# Patient Record
Sex: MALE | Race: WHITE | NOT HISPANIC OR LATINO | Employment: FULL TIME | URBAN - METROPOLITAN AREA
[De-identification: names, ages, dates, MRNs, and addresses within clinical notes are randomized per-mention and may not be internally consistent; named-entity substitution may affect disease eponyms.]

---

## 2024-09-10 ENCOUNTER — HOSPITAL ENCOUNTER (OUTPATIENT)
Dept: RADIOLOGY | Facility: HOSPITAL | Age: 29
Discharge: HOME | End: 2024-09-10
Attending: ORTHOPAEDIC SURGERY
Payer: COMMERCIAL

## 2024-09-10 ENCOUNTER — OFFICE VISIT (OUTPATIENT)
Dept: ORTHOPEDICS | Facility: CLINIC | Age: 29
End: 2024-09-10
Payer: COMMERCIAL

## 2024-09-10 VITALS — HEIGHT: 72 IN | BODY MASS INDEX: 28.44 KG/M2 | WEIGHT: 210 LBS

## 2024-09-10 DIAGNOSIS — M79.641 RIGHT HAND PAIN: Primary | ICD-10-CM

## 2024-09-10 DIAGNOSIS — M79.641 RIGHT HAND PAIN: ICD-10-CM

## 2024-09-10 DIAGNOSIS — S62.324A CLOSED DISPLACED FRACTURE OF SHAFT OF FOURTH METACARPAL BONE OF RIGHT HAND, INITIAL ENCOUNTER: Primary | ICD-10-CM

## 2024-09-10 PROBLEM — S62.304A CLOSED DISPLACED FRACTURE OF FOURTH METACARPAL BONE OF RIGHT HAND: Status: ACTIVE | Noted: 2024-09-10

## 2024-09-10 PROCEDURE — 73130 X-RAY EXAM OF HAND: CPT | Mod: TC,RT

## 2024-09-10 PROCEDURE — 1159F MED LIST DOCD IN RCRD: CPT | Mod: CPTII,S$GLB,, | Performed by: ORTHOPAEDIC SURGERY

## 2024-09-10 PROCEDURE — 99204 OFFICE O/P NEW MOD 45 MIN: CPT | Mod: S$GLB,,, | Performed by: ORTHOPAEDIC SURGERY

## 2024-09-10 PROCEDURE — 1160F RVW MEDS BY RX/DR IN RCRD: CPT | Mod: CPTII,S$GLB,, | Performed by: ORTHOPAEDIC SURGERY

## 2024-09-10 PROCEDURE — 3008F BODY MASS INDEX DOCD: CPT | Mod: CPTII,S$GLB,, | Performed by: ORTHOPAEDIC SURGERY

## 2024-09-10 PROCEDURE — 99999 PR PBB SHADOW E&M-NEW PATIENT-LVL II: CPT | Mod: PBBFAC,,, | Performed by: ORTHOPAEDIC SURGERY

## 2024-09-10 PROCEDURE — 73130 X-RAY EXAM OF HAND: CPT | Mod: 26,RT,, | Performed by: RADIOLOGY

## 2024-09-10 RX ORDER — PANTOPRAZOLE SODIUM 40 MG/1
40 TABLET, DELAYED RELEASE ORAL
COMMUNITY
Start: 2024-07-03

## 2024-09-10 RX ORDER — DEXTROAMPHETAMINE SACCHARATE, AMPHETAMINE ASPARTATE, DEXTROAMPHETAMINE SULFATE AND AMPHETAMINE SULFATE 3.75; 3.75; 3.75; 3.75 MG/1; MG/1; MG/1; MG/1
2 TABLET ORAL NIGHTLY
COMMUNITY
Start: 2024-06-30

## 2024-09-10 RX ORDER — DEXTROAMPHETAMINE SACCHARATE, AMPHETAMINE ASPARTATE MONOHYDRATE, DEXTROAMPHETAMINE SULFATE AND AMPHETAMINE SULFATE 5; 5; 5; 5 MG/1; MG/1; MG/1; MG/1
CAPSULE, EXTENDED RELEASE ORAL EVERY MORNING
COMMUNITY
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
## 2024-09-10 NOTE — ASSESSMENT & PLAN NOTE
The patient and I talked at length about the natural history and pathophysiology of his injury which is right ring finger metacarpal fracture displaced , he understands that this may lead to chronic problems which may have acute episodic exacerbations.   Symptoms may resolve, worsen and even become permanent.  The patient understands the treatment options including observation, activity modification, therapy, NSAIDs, splints and the surgical options including open reduction internal fixation.  We discussed the risks of the diagnosis and the treatment options including pain, infection, bleeding, damage to nerves and vessels, stiffness, scarring, incomplete relief or recurrence of symptoms, poor pain and functional outcomes.  Unique risks of this diagnosis and the treatment include malunion nonunion stiffness delayed union.  The patient has elected to take 24 hours to think about it he will call us tomorrow if he decides to proceed with surgery we will schedule him for Friday.  If he elects to proceed with nonoperative treatment he understands he will likely continue to have an extensor lag and we will need to keep an eye on his x-rays weekly.
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
## 2024-09-12 ENCOUNTER — ANESTHESIA EVENT (OUTPATIENT)
Dept: SURGERY | Facility: HOSPITAL | Age: 29
End: 2024-09-12
Payer: COMMERCIAL

## 2024-09-12 ENCOUNTER — TELEPHONE (OUTPATIENT)
Dept: PREADMISSION TESTING | Facility: HOSPITAL | Age: 29
End: 2024-09-12
Payer: COMMERCIAL

## 2024-09-12 DIAGNOSIS — S62.324A CLOSED DISPLACED FRACTURE OF SHAFT OF FOURTH METACARPAL BONE OF RIGHT HAND, INITIAL ENCOUNTER: Primary | ICD-10-CM

## 2024-09-12 NOTE — TELEPHONE ENCOUNTER
Pre op instructions reviewed with patient per phone.      To confirm, your doctor has instructed you: Surgery is scheduled for 9/13/24 and arrival @ 8AM.    Surgery will be at [facility],  The address is [address]. [place] [address].      IMPORTANT INSTRUCTIONS!    Do not eat or drink after 12 midnight, including water. Do not smoke or use chewing tobacco after 12 midnight!  OK to brush teeth, but no gum, candy, or mints!      *Take only these medicines with a small swallow of water-morning of surgery*     Protonix         ____ Stop taking all vitamins, herbal supplements, Aspirin, & NSAIDS (Ibuprofen, Advil, Aleve) 7 days prior to surgery, as these can thin your blood.    ____ Weight loss medication, such as Adipex and Phentermine, must be stopped 14 days prior to surgery, no exceptions!    *Diabetic Patients: If you take diabetic or weight loss medication, do NOT take morning of surgery unless instructed by Doctor. Metformin to be stopped 24 hrs prior to surgery. DO NOT take short-acting insulin the day of surgery. Only take HALF of your regular dose of long-acting insulin the night before surgery, unless instructed otherwise. Blood sugars will be checked in pre-op.   ~Ozempic/Mounjaro/Wegovy/Trulicity/Semaglutide injections must be stopped 7 days prior to surgery.     Please notify MD office if you develop an active infection, are prescribed antibiotics by someone other than the surgeon doing your surgery, or visit urgent care/ER.      Bathing Instructions:   The night before surgery and the morning prior to coming to the hospital:    - Shower & rinse your body as usual with anti-bacterial Soap (Dial or Lever 2000)   -Hibiclens (if indicated) use AFTER anti-bacterial soap; 1 packet PM/1 packet in AM on surgical site only   -Do not use hibiclens on your head, face, or genitals.    -Do not wash with anti-bacterial soap after you use the hibiclens.    -Do not shave surgical site 5-7 days  prior to surgery.    -Pubic hair 7 days prior to surgery (OBGYN/Urology only).       Additional Instructions:   __ No makeup, powder, lotions, creams, or body spray to skin   __ No deodorant if you are having: breast procedure, PORT, or upper shoulder surgery!   __ No nail polish or artificial nails       **SURGERY WILL BE CANCELLED IF ARTIFICIAL/NAIL POLISH IS PRESENT!!!**  __ Please remove all piercings and leave all jewelry at home.    **SURGERY WILL BE CANCELLED IF PIERCINGS ARE PRESENT!!!**    __ Dentures, Hearing Aids and Contact Lens need to be removed prior to the start of surgery.    __ Avoid Alcoholic beverages 3 days prior to surgery, as it can thin the blood, unless told otherwise by pre-admit department.  __ Females: may need to give a urine sample the morning of surgery;   **Please ask  for a specimen cup if you need to use the restroom prior to being called into pre-op.**  __ Males: Stop ED medications (Viagra, Cialis) 24 hrs prior to surgery.  __ Wear clean, loose-fitting clothing. Allow for dressings/bandages/surgical equipment   __ You must have transportation, and they MUST stay the entire time.   __  Bring photo ID and insurance information to hospital    [facility] Visitor/Ride Policy:    Only 1 adult allowed (18 or older) to accompany you and MUST STAY through the entire admission length.      -Must have a ride home from a responsible adult that you know and trust.     -Medical Transport, Uber or Lyft can only be used if patient has a responsible adult to   accompany them during ride home.      ~Your ride MUST STAY the entire time until you are discharged~        *If you are running late or have questions the morning of surgery, please call the Pre-OP Department @ [phone].       *Please Call [facility] Pre-Admit Department for surgery instruction questions:   [phone]       Financial Questions:                                  Additional Payment Question Numbers:    [place]: [phone] or [phone]

## 2024-09-13 ENCOUNTER — HOSPITAL ENCOUNTER (OUTPATIENT)
Facility: HOSPITAL | Age: 29
Discharge: HOME | End: 2024-09-13
Attending: ORTHOPAEDIC SURGERY | Admitting: ORTHOPAEDIC SURGERY
Payer: COMMERCIAL

## 2024-09-13 ENCOUNTER — ANESTHESIA (OUTPATIENT)
Dept: SURGERY | Facility: HOSPITAL | Age: 29
End: 2024-09-13
Payer: COMMERCIAL

## 2024-09-13 ENCOUNTER — HOSPITAL ENCOUNTER (OUTPATIENT)
Dept: RADIOLOGY | Facility: HOSPITAL | Age: 29
Discharge: HOME | End: 2024-09-13
Attending: ORTHOPAEDIC SURGERY | Admitting: ORTHOPAEDIC SURGERY
Payer: COMMERCIAL

## 2024-09-13 VITALS
BODY MASS INDEX: 28.98 KG/M2 | HEART RATE: 80 BPM | OXYGEN SATURATION: 96 % | DIASTOLIC BLOOD PRESSURE: 75 MMHG | SYSTOLIC BLOOD PRESSURE: 132 MMHG | HEIGHT: 72 IN | RESPIRATION RATE: 17 BRPM | WEIGHT: 213.94 LBS | TEMPERATURE: 98 F

## 2024-09-13 DIAGNOSIS — Z41.9 SURGERY, ELECTIVE: ICD-10-CM

## 2024-09-13 DIAGNOSIS — S62.324A CLOSED DISPLACED FRACTURE OF SHAFT OF FOURTH METACARPAL BONE OF RIGHT HAND, INITIAL ENCOUNTER: Primary | ICD-10-CM

## 2024-09-13 PROCEDURE — 63600175 PHARM REV CODE 636 W HCPCS: Performed by: NURSE ANESTHETIST, CERTIFIED REGISTERED

## 2024-09-13 PROCEDURE — 25000003 PHARM REV CODE 250: Performed by: NURSE ANESTHETIST, CERTIFIED REGISTERED

## 2024-09-13 PROCEDURE — 25000003 PHARM REV CODE 250: Performed by: ANESTHESIOLOGY

## 2024-09-13 PROCEDURE — 27200651 HC AIRWAY, LMA: Performed by: ANESTHESIOLOGY

## 2024-09-13 PROCEDURE — 63600175 PHARM REV CODE 636 W HCPCS: Performed by: ANESTHESIOLOGY

## 2024-09-13 PROCEDURE — 64415 NJX AA&/STRD BRCH PLXS IMG: CPT | Performed by: ANESTHESIOLOGY

## 2024-09-13 PROCEDURE — 26615 TREAT METACARPAL FRACTURE: CPT | Mod: LT,,, | Performed by: ORTHOPAEDIC SURGERY

## 2024-09-13 PROCEDURE — 64417 NJX AA&/STRD AX NERVE IMG: CPT | Mod: 59,RT,, | Performed by: ANESTHESIOLOGY

## 2024-09-13 PROCEDURE — 73120 X-RAY EXAM OF HAND: CPT | Mod: 26,RT,, | Performed by: RADIOLOGY

## 2024-09-13 PROCEDURE — 73120 X-RAY EXAM OF HAND: CPT | Mod: TC,RT

## 2024-09-13 RX ORDER — DEXAMETHASONE SODIUM PHOSPHATE 4 MG/ML
INJECTION, SOLUTION INTRA-ARTICULAR; INTRALESIONAL; INTRAMUSCULAR; INTRAVENOUS; SOFT TISSUE
Status: DISCONTINUED | OUTPATIENT
Start: 2024-09-13 | End: 2024-09-13

## 2024-09-13 RX ORDER — CHLORHEXIDINE GLUCONATE ORAL RINSE 1.2 MG/ML
10 SOLUTION DENTAL
Status: DISCONTINUED | OUTPATIENT
Start: 2024-09-13 | End: 2024-09-13 | Stop reason: HOSPADM

## 2024-09-13 RX ORDER — PROPOFOL 10 MG/ML
VIAL (ML) INTRAVENOUS
Status: DISCONTINUED | OUTPATIENT
Start: 2024-09-13 | End: 2024-09-13

## 2024-09-13 RX ORDER — FENTANYL CITRATE 50 UG/ML
INJECTION, SOLUTION INTRAMUSCULAR; INTRAVENOUS
Status: DISCONTINUED | OUTPATIENT
Start: 2024-09-13 | End: 2024-09-13

## 2024-09-13 RX ORDER — GLUCAGON 1 MG
1 KIT INJECTION
Status: DISCONTINUED | OUTPATIENT
Start: 2024-09-13 | End: 2024-09-13 | Stop reason: HOSPADM

## 2024-09-13 RX ORDER — DEXMEDETOMIDINE HYDROCHLORIDE 100 UG/ML
INJECTION, SOLUTION INTRAVENOUS
Status: DISCONTINUED | OUTPATIENT
Start: 2024-09-13 | End: 2024-09-13

## 2024-09-13 RX ORDER — FENTANYL CITRATE 50 UG/ML
25 INJECTION, SOLUTION INTRAMUSCULAR; INTRAVENOUS EVERY 5 MIN PRN
Status: DISCONTINUED | OUTPATIENT
Start: 2024-09-13 | End: 2024-09-13 | Stop reason: HOSPADM

## 2024-09-13 RX ORDER — HYDROCODONE BITARTRATE AND ACETAMINOPHEN 5; 325 MG/1; MG/1
1 TABLET ORAL EVERY 6 HOURS PRN
Qty: 10 TABLET | Refills: 0 | Status: SHIPPED | OUTPATIENT
Start: 2024-09-13 | End: 2024-09-20

## 2024-09-13 RX ORDER — CEFAZOLIN SODIUM 1 G/3ML
INJECTION, POWDER, FOR SOLUTION INTRAMUSCULAR; INTRAVENOUS
Status: DISCONTINUED | OUTPATIENT
Start: 2024-09-13 | End: 2024-09-13

## 2024-09-13 RX ORDER — ONDANSETRON HYDROCHLORIDE 2 MG/ML
INJECTION, SOLUTION INTRAMUSCULAR; INTRAVENOUS
Status: DISCONTINUED | OUTPATIENT
Start: 2024-09-13 | End: 2024-09-13

## 2024-09-13 RX ORDER — ROPIVACAINE HYDROCHLORIDE 5 MG/ML
INJECTION, SOLUTION EPIDURAL; INFILTRATION; PERINEURAL
Status: COMPLETED | OUTPATIENT
Start: 2024-09-13 | End: 2024-09-13

## 2024-09-13 RX ORDER — MEPERIDINE HYDROCHLORIDE 25 MG/ML
12.5 INJECTION INTRAMUSCULAR; INTRAVENOUS; SUBCUTANEOUS ONCE
Status: DISCONTINUED | OUTPATIENT
Start: 2024-09-13 | End: 2024-09-13 | Stop reason: HOSPADM

## 2024-09-13 RX ORDER — LIDOCAINE HYDROCHLORIDE 10 MG/ML
INJECTION, SOLUTION EPIDURAL; INFILTRATION; INTRACAUDAL; PERINEURAL
Status: COMPLETED | OUTPATIENT
Start: 2024-09-13 | End: 2024-09-13

## 2024-09-13 RX ORDER — MIDAZOLAM HYDROCHLORIDE 1 MG/ML
INJECTION INTRAMUSCULAR; INTRAVENOUS
Status: DISCONTINUED | OUTPATIENT
Start: 2024-09-13 | End: 2024-09-13

## 2024-09-13 RX ORDER — DIPHENHYDRAMINE HYDROCHLORIDE 50 MG/ML
25 INJECTION INTRAMUSCULAR; INTRAVENOUS EVERY 6 HOURS PRN
Status: DISCONTINUED | OUTPATIENT
Start: 2024-09-13 | End: 2024-09-13 | Stop reason: HOSPADM

## 2024-09-13 RX ORDER — CHLORHEXIDINE GLUCONATE ORAL RINSE 1.2 MG/ML
10 SOLUTION DENTAL 2 TIMES DAILY
Status: DISCONTINUED | OUTPATIENT
Start: 2024-09-13 | End: 2024-09-13 | Stop reason: HOSPADM

## 2024-09-13 RX ORDER — ALBUTEROL SULFATE 0.83 MG/ML
2.5 SOLUTION RESPIRATORY (INHALATION) EVERY 4 HOURS PRN
Status: DISCONTINUED | OUTPATIENT
Start: 2024-09-13 | End: 2024-09-13 | Stop reason: HOSPADM

## 2024-09-13 RX ORDER — HYDROCODONE BITARTRATE AND ACETAMINOPHEN 5; 325 MG/1; MG/1
1 TABLET ORAL
Status: DISCONTINUED | OUTPATIENT
Start: 2024-09-13 | End: 2024-09-13 | Stop reason: HOSPADM

## 2024-09-13 RX ORDER — ONDANSETRON HYDROCHLORIDE 2 MG/ML
4 INJECTION, SOLUTION INTRAVENOUS ONCE AS NEEDED
Status: DISCONTINUED | OUTPATIENT
Start: 2024-09-13 | End: 2024-09-13 | Stop reason: HOSPADM

## 2024-09-13 RX ORDER — HYDROCODONE BITARTRATE AND ACETAMINOPHEN 5; 325 MG/1; MG/1
1 TABLET ORAL EVERY 4 HOURS PRN
Status: DISCONTINUED | OUTPATIENT
Start: 2024-09-13 | End: 2024-09-13 | Stop reason: HOSPADM

## 2024-09-13 RX ORDER — ACETAMINOPHEN 10 MG/ML
INJECTION, SOLUTION INTRAVENOUS
Status: DISCONTINUED | OUTPATIENT
Start: 2024-09-13 | End: 2024-09-13

## 2024-09-13 RX ORDER — LIDOCAINE HYDROCHLORIDE 20 MG/ML
INJECTION INTRAVENOUS
Status: DISCONTINUED | OUTPATIENT
Start: 2024-09-13 | End: 2024-09-13

## 2024-09-13 RX ADMIN — LIDOCAINE HYDROCHLORIDE 100 MG: 20 INJECTION INTRAVENOUS at 12:09

## 2024-09-13 RX ADMIN — SODIUM CHLORIDE, POTASSIUM CHLORIDE, SODIUM LACTATE AND CALCIUM CHLORIDE: 600; 310; 30; 20 INJECTION, SOLUTION INTRAVENOUS at 09:09

## 2024-09-13 RX ADMIN — DEXMEDETOMIDINE HYDROCHLORIDE 8 MCG: 100 INJECTION, SOLUTION INTRAVENOUS at 12:09

## 2024-09-13 RX ADMIN — PROPOFOL 250 MG: 10 INJECTION, EMULSION INTRAVENOUS at 12:09

## 2024-09-13 RX ADMIN — MIDAZOLAM 2 MG: 1 INJECTION INTRAMUSCULAR; INTRAVENOUS at 09:09

## 2024-09-13 RX ADMIN — FENTANYL CITRATE 50 MCG: 0.05 INJECTION, SOLUTION INTRAMUSCULAR; INTRAVENOUS at 12:09

## 2024-09-13 RX ADMIN — FENTANYL CITRATE 100 MCG: 0.05 INJECTION, SOLUTION INTRAMUSCULAR; INTRAVENOUS at 09:09

## 2024-09-13 RX ADMIN — DEXAMETHASONE SODIUM PHOSPHATE 8 MG: 4 INJECTION, SOLUTION INTRA-ARTICULAR; INTRALESIONAL; INTRAMUSCULAR; INTRAVENOUS; SOFT TISSUE at 12:09

## 2024-09-13 RX ADMIN — CEFAZOLIN 2 G: 330 INJECTION, POWDER, FOR SOLUTION INTRAMUSCULAR; INTRAVENOUS at 12:09

## 2024-09-13 RX ADMIN — LIDOCAINE HYDROCHLORIDE 2 MG: 10 SOLUTION INTRAVENOUS at 09:09

## 2024-09-13 RX ADMIN — ONDANSETRON 4 MG: 2 INJECTION INTRAMUSCULAR; INTRAVENOUS at 01:09

## 2024-09-13 RX ADMIN — ACETAMINOPHEN 1000 MG: 10 INJECTION, SOLUTION INTRAVENOUS at 12:09

## 2024-09-13 RX ADMIN — ROPIVACAINE HYDROCHLORIDE 28 ML: 5 INJECTION, SOLUTION EPIDURAL; INFILTRATION; PERINEURAL at 09:09

## 2024-09-13 NOTE — ANESTHESIA PROCEDURE NOTES
Peripheral Block    Patient location during procedure: pre-op   Block not for primary anesthetic.  Reason for block: at surgeon's request and post-op pain management   Post-op Pain Location: Right wrist   Start time: 9/13/2024 9:40 AM  Timeout: 9/13/2024 9:40 AM   End time: 9/13/2024 9:47 AM    Staffing  Authorizing Provider: Kristan Stein MD  Performing Provider: Kristan Stein MD    Staffing  Other anesthesia staff: Mery Beyer CRNA  Performed by: Kristan Stein MD  Authorized by: Kristan Stein MD    Preanesthetic Checklist  Completed: patient identified, IV checked, site marked, risks and benefits discussed, surgical consent, monitors and equipment checked, pre-op evaluation and timeout performed  Peripheral Block  Patient position: supine  Prep: ChloraPrep  Patient monitoring: heart rate, cardiac monitor, continuous pulse ox, continuous capnometry and frequent blood pressure checks  Block type: axillary  Laterality: right  Injection technique: single shot  Needle  Needle type: Stimuplex   Needle gauge: 21 G  Needle length: 4 in  Needle localization: anatomical landmarks, ultrasound guidance and nerve stimulator   -ultrasound image captured on disc.  Assessment  Injection assessment: negative aspiration and negative parasthesia  Paresthesia pain: none  Heart rate change: no  Slow fractionated injection: yes  Pain Tolerance: comfortable throughout block and no complaints  Medications:    Medications: lidocaine (PF) injection 1% - Other   2 mg - 9/13/2024 9:40:00 AM  ropivacaine (NAROPIN) injection 0.5% - Perineural   28 mL - 9/13/2024 9:47:00 AM    Additional Notes  VSS.  [facility] RN monitoring vitals throughout procedure.  Patient tolerated procedure well.

## 2024-09-13 NOTE — ANESTHESIA PREPROCEDURE EVALUATION
09/13/2024  Donaldo Claudio is a 29 y.o., male.  History reviewed. No pertinent past medical history.  Past Surgical History:   Procedure Laterality Date    SURGICAL REMOVAL OF ULCER      stomach ulcer; 2022         Pre-op Assessment    I have reviewed the Patient Summary Reports.     I have reviewed the Nursing Notes. I have reviewed the NPO Status.   I have reviewed the Medications.     Review of Systems  Anesthesia Hx:  No problems with previous Anesthesia   History of prior surgery of interest to airway management or planning:  Previous anesthesia: General        Denies Family Hx of Anesthesia complications.    Denies Personal Hx of Anesthesia complications.                    Social:  Non-Smoker       Hematology/Oncology:  Hematology Normal                                     Cardiovascular:  Cardiovascular Normal                                            Pulmonary:  Pulmonary Normal                       Renal/:  Renal/ Normal                 Hepatic/GI:  Hepatic/GI Normal                 Neurological:  Neurology Normal                                      Psych:  Psychiatric History anxiety depression                Physical Exam  General: Alert and Oriented    Airway:  Mallampati: II   Mouth Opening: Normal  TM Distance: Normal  Tongue: Normal  Neck ROM: Normal ROM    Dental:  Intact    Chest/Lungs:  Clear to auscultation, Normal Respiratory Rate    Heart:  Rate: Normal  Rhythm: Regular Rhythm        Anesthesia Plan  Type of Anesthesia, risks & benefits discussed:    Anesthesia Type: Gen Supraglottic Airway  Intra-op Monitoring Plan: Standard ASA Monitors  Post Op Pain Control Plan: multimodal analgesia and IV/PO Opioids PRN  Induction:  IV  Informed Consent: Informed consent signed with the Patient and all parties understand the risks and agree with anesthesia plan.  All questions answered.   ASA  Score: 2  Day of Surgery Review of History & Physical: H&P Update referred to the surgeon/provider.    Ready For Surgery From Anesthesia Perspective.     .

## 2024-09-13 NOTE — PLAN OF CARE
Right axilary nerve block performed by Dr. Stein  with Mery SUGGS and Lexi GUY at bedside assisting, pt on continuous cardiac monitor, pt tolerated well.

## 2024-09-13 NOTE — ANESTHESIA PROCEDURE NOTES
Intubation    Date/Time: 9/13/2024 12:30 PM    Performed by: Amarilys Mckeon CRNA  Authorized by: Marino Sibley MD    Intubation:     Induction:  Intravenous    Intubated:  Postinduction    Mask Ventilation:  Easy mask    Attempts:  1    Attempted By:  CRNA    Difficult Airway Encountered?: No      Complications:  None    Airway Device:  Supraglottic airway/LMA    Airway Device Size:  5.0    Style/Cuff Inflation:  Cuffed (inflated to minimal occlusive pressure)    Secured at:  The lips    Placement Verified By:  Capnometry    Complicating Factors:  None    Findings Post-Intubation:  BS equal bilateral and atraumatic/condition of teeth unchanged  Notes:      I gel #5 placed

## 2024-09-13 NOTE — OP NOTE
BOBBY Nolasco M.D.  Orthopaedic Hand and Wrist Surgery  [facility]    OPERATIVE REPORT    Procedure Date: 9/13/2024     Patient Name: Donaldo Claudio     YOB: 1995    Pre-Operative Diagnosis:  Closed displaced fracture of shaft of fourth metacarpal bone of right hand, initial encounter [S62.324A]     Post-Operative Diagnosis:  Post-Op Diagnosis Codes:     * Closed displaced fracture of shaft of fourth metacarpal bone of right hand, initial encounter [S62.324A]     Procedure Performed:  Procedure(s) (LRB):  Right hand ORIF of 4th metacarpal (Right)       Surgeon: BOBBY Nolasco MD    Assistant: None    Anesthesia: General    Fluids: See Anesthesia Record    Urine Output: See Anesthesia Record    Estimated Blood Loss: * No values recorded between 9/13/2024 12:42 PM and 9/13/2024  2:08 PM *    Implants:   Implant Name Type Inv. Item Serial No.  Lot No. LRB No. Used Action   T-Plate, 1.6 mm      Right 1 Implanted   1.6 mm Cortical Screws      Right 2 Implanted   1.6 mm Cortical Screws      Right 1 Implanted   1.6 mm Cortical Screws      Right 1 Implanted   1.6mm PAULO screws      Right 1 Implanted and Explanted   1.6mm PAULO screws      Right 1 Implanted   1.6mm PAULO screws      Right 1 Implanted and Explanted   guidewire      Right 1 Implanted and Explanted   IMPLANT RECORD       1 Implanted       Specimens:   Specimen (24h ago, onward)      None             Drains: None    Tourniquet Time:   Total Tourniquet Time Documented:  Arm  (Right) - 82 minutes  Total: Arm  (Right) - 82 minutes       Complications: No    Fluoro/C-arm utilized during the case: Yes - stable fixation of the right ring finger metacarpal fracture with 1.6 mm plate    [place]/Microscope: 3.5x Loupe magnification was utilized for this case    Indications for Procedure and Brief History:  Donaldo Claudio is a 29 y.o. male status post right ring finger metacarpal fracture shortened    I had a long discussion  with the patient about treatment and diagnostic options. We discussed operative and non-operative options and expected outcomes of their diagnosis and co-morbidities. We discussed the risks and benefits of surgery at length, including but not limited to pain, infection, bleeding, damage to adjacent structures such as nerves and blood vessels, failure of appropriate healing, stiffness, scarring, laxity, need for more surgery, stroke, blood clot, loss of life, loss of limb, need for removal of any implants used, anesthesia risks, breathing problems, and heart problems. We talked about common and uncommon risks. We discussed risks that were higher specific to the patient and their co-morbid conditions.  We discussed expected treatment outcomes in regards to pain, function and the possibility of permanent impairment.  They expressed understanding of the risks and benefits an opted to proceed with surgical management.  The patient was consented and marked prior to transfer to the operating room.    Intra-Operative Findings:  There was stable fixation of the right ring finger metacarpal with dorsal 1.6 mm plate and screw    Description of Procedure: I met the patient in the preoperative holding area. I identified, confirmed, and marked the operative extremity. All questions were answered. The patient was then taken back to the operating room and transferred to the operative table. The patient was placed in the supine position. All bony prominences were padded. The operative extremity was then prepped and draped in the standard sterile fashion with alcohol and chlorhexidine solutions. A timeout was performed to ensure we had the proper patient, proper operative site, and were performing the proper procedure. All members of the operative team were in agreement with this.     Right upper extremity was exsanguinated with an Esmarch followed by inflation of the upper arm tourniquet at 250 mmHg a longitudinal incision was made  over the ring finger metacarpal blunt dissection was carried through subcutaneous tissue we are able to retract the extensor tendons the periosteum which had a rent in it was elevated off of the fracture portion of the ring finger metacarpal we then cleaned out the early fracture healing callus.  We are then able to provisionally reduce the fracture we attempted to place a lag screw but had difficulty with getting the lag screw to hold we removed the lag screw and applied a dorsal buttress plate which was 1.6 mm.  We provisionally placed 1 screw proximally and 1 screw distally x-rays confirmed adequate reduction good plate placement we then ended up placing a total of 3 screws were proximal and 3 distal x-rays confirmed adequate reduction and fixation of the fracture there was no rotational malalignment of the ring finger the wound was then irrigated the periosteum was closed with 4-0 Vicryl followed by closure of the skin with 4-0 Vicryl deep dermal stitches and 4-0 Prolene Xeroform was placed over the wound followed by a ulnar gutter splint after tourniquet released and dressing application of the fingers have good brisk capillary from    All sponge, instrument, and needle counts were correct at the conclusion of the case.      Condition: Good    Disposition: PACU - hemodynamically stable.    Attestation: I was present and scrubbed for the entire procedure.    Post-Operative Exam:  The patient was examined post-operatively and the limb was warm and well perfused with appropriate neurovascular status relative to preoperative block status. The dressing was intact.      BOBBY Nolasco MD  Orthopaedic Hand and Wrist Surgery

## 2024-09-13 NOTE — TRANSFER OF CARE
Anesthesia Transfer of Care Note    Patient: Donaldo Claudio    Procedure(s) Performed: Procedure(s) (LRB):  Right hand ORIF of 4th metacarpal (Right)    Patient location: PACU    Anesthesia Type: general    Transport from OR: Transported from OR on room air with adequate spontaneous ventilation    Post pain: adequate analgesia    Post assessment: no apparent anesthetic complications and tolerated procedure well    Post vital signs: stable    Level of consciousness: awake and responds to stimulation    Nausea/Vomiting: no nausea/vomiting    Complications: none    Transfer of care protocol was followed      Last vitals: Visit Vitals  BP (!) 148/75 (BP Location: Left arm, Patient Position: Lying)   Pulse 75   Temp 36.5 °C (97.7 °F) (Temporal)   Resp 18   Ht 6' (1.829 m)   Wt 97 kg (213 lb 15.3 oz)   SpO2 98%   BMI 29.02 kg/m²

## 2024-09-13 NOTE — DISCHARGE SUMMARY
[facility]  Discharge Note  Short Stay    Procedure(s) (LRB):  Right hand ORIF of 4th metacarpal (Right)      OUTCOME: Patient tolerated treatment/procedure well without complication and is now ready for discharge.    DISPOSITION: Home or Self Care    FINAL DIAGNOSIS:  <principal problem not specified>    FOLLOWUP: In clinic    DISCHARGE INSTRUCTIONS:    Discharge Procedure Orders   Ambulatory referral/consult to Physical/Occupational Therapy   Standing Status: Future   Referral Priority: Routine Referral Type: Physical Medicine   Referral Reason: Specialty Services Required   Number of Visits Requested: 1     Diet general     Keep surgical extremity elevated     Lifting restrictions     No driving, operating heavy equipment or signing legal documents while taking pain medication.     Leave dressing on - Keep it clean, dry, and intact until clinic visit     Call MD for:  temperature >100.4     Call MD for:  persistent nausea and vomiting     Call MD for:  severe uncontrolled pain     Call MD for:  difficulty breathing, headache or visual disturbances     Call MD for:  redness, tenderness, or signs of infection (pain, swelling, redness, odor or green/yellow discharge around incision site)     Call MD for:  hives     Call MD for:  persistent dizziness or light-headedness     Call MD for:  extreme fatigue        TIME SPENT ON DISCHARGE: 5 minutes

## 2024-09-13 NOTE — ANESTHESIA POSTPROCEDURE EVALUATION
Anesthesia Post Evaluation    Patient: Donaldo Claudio    Procedure(s) Performed: Procedure(s) (LRB):  Right hand ORIF of 4th metacarpal (Right)    Final Anesthesia Type: general      Patient location during evaluation: PACU  Patient participation: Yes- Able to Participate  Level of consciousness: awake  Post-procedure vital signs: reviewed and stable  Pain management: adequate  Airway patency: patent    PONV status at discharge: No PONV  Anesthetic complications: no      Cardiovascular status: stable  Respiratory status: unassisted  Hydration status: euvolemic  Follow-up not needed.              Vitals Value Taken Time   /68 09/13/24 1428   Temp 36.8 °C (98.2 °F) 09/13/24 1409   Pulse 83 09/13/24 1428   Resp 10 09/13/24 1428   SpO2 98 % 09/13/24 1428   Vitals shown include unfiled device data.      No case tracking events are documented in the log.      Pain/Nidhi Score: Nidhi Score: 9 (9/13/2024  2:09 PM)

## 2024-09-13 NOTE — DISCHARGE INSTRUCTIONS
Discharge Instructions     Patient Name: Donaldo Claudio  Procedure: Procedure(s) (LRB):  Right hand ORIF of 4th metacarpal (Right)     Surgeon: BOBBY Nolasco M.D.     Date of Surgery: 9/13/2024      Wound Care: Keep incision clean and dry until follow up. Do not remove dressing.   Do not get dressing wet!  Weight Bearing Status: Non-weight bearing to the operative extremity.  Activity: Please keep your operative arm elevated.  Please flex and extend your exposed fingers several times per hour.  This will help reduce swelling at the operative site. If the fingers are getting stiff move them more often.  Medication: Take these medications as directed. You should take pain medications with food.    Current Discharge Medication List        START taking these medications    Details   HYDROcodone-acetaminophen (NORCO) 5-325 mg per tablet Take 1 tablet by mouth every 6 (six) hours as needed for Pain.  Qty: 10 tablet, Refills: 0    Comments: Quantity prescribed more than 7 day supply? No             While on opioid (narcotic) pain medication, please refrain from:  Driving  Operating Heavy Machinery/Power Tools  Drinking alcohol  All narcotics can cause some degree of itching, sedation, constipation and nausea. You should take stool softeners to prevent constipation. These can be purchased over the counter.   Prescription refill requests are only accepted during normal business hours (Monday-Friday 8am-4pm) and may take up to 48 hours to fill.     If you have any of the following signs or symptoms please proceed to your nearest Emergency Room:   Chest Pain  Shortness of Breath/ Difficulty Breathing  Excessive Bleeding    Please call the office if you have the following:   Excessive swelling that doesn't improve with elevation  Foul smelling odor from your wounds or dressings  Discharge from your surgical wounds  Persistent pain that does not get better with the prescription pain medication & elevation  Persistent nausea  and/or vomiting  Fevers greater than 101.1 after the first 48 hours post op  Dramatic increase in post-operative pain    BOBBY Nolasco M.D.  [facility]  Orthopedic Hand and Wrist Surgeon    [facility]  Dr. Brooks Nolasco   Google Review   Health[employer]   [employer]

## 2024-09-17 ENCOUNTER — CLINICAL SUPPORT (OUTPATIENT)
Dept: REHABILITATION | Facility: HOSPITAL | Age: 29
End: 2024-09-17
Payer: COMMERCIAL

## 2024-09-17 ENCOUNTER — TELEPHONE (OUTPATIENT)
Dept: ORTHOPEDICS | Facility: CLINIC | Age: 29
End: 2024-09-17
Payer: COMMERCIAL

## 2024-09-17 DIAGNOSIS — S62.324A CLOSED DISPLACED FRACTURE OF SHAFT OF FOURTH METACARPAL BONE OF RIGHT HAND, INITIAL ENCOUNTER: ICD-10-CM

## 2024-09-17 DIAGNOSIS — R29.898 REDUCED HAND STRENGTH: Primary | ICD-10-CM

## 2024-09-17 DIAGNOSIS — M25.60 RANGE OF MOTION DEFICIT: ICD-10-CM

## 2024-09-17 PROCEDURE — 97110 THERAPEUTIC EXERCISES: CPT

## 2024-09-17 PROCEDURE — L3808 WHFO, RIGID W/O JOINTS: HCPCS

## 2024-09-17 PROCEDURE — 97760 ORTHOTIC MGMT&TRAING 1ST ENC: CPT

## 2024-09-17 NOTE — TELEPHONE ENCOUNTER
LVM returning call and informed him not to get his stitches wet at all until he sees us at his post op visit.  Left number to return call.    ----- Message from Huyen Fontenot sent at 9/17/2024  1:16 PM CDT -----  Contact: Donaldo  Patient went into physical therapy today and they changed him into a wrap. Reports being told he can get the stiches wet as long as he dries them before re wrapping. Pt wanting to confirm info, .[phone].

## 2024-09-17 NOTE — PATIENT INSTRUCTIONS
[facility] Occupational Therapy  Orthosis Instructions and Proof of Delivery        Date: 9/17/2024  Name: Donaldo Claudio  MRN: [id number]  YOB: 1995  Referring Provider: Cornelio Nolasco MD  Diagnosis: Closed displaced fracture of shaft of fourth metacarpal bone of right hand, initial encounter     [facility] Level II Code and Description      Orthosis Information  (X) Custom Fabricated              () Prefabricated  (X) Right                                    () Left                                         () Bilateral  (X) Static                                   () Static Progressive                  () Dynamic    Orthosis Instructions  (X) Wear the orthosis at all times  (X) Remove for hygiene, exercises, dressing changes    Cleaning and Maintenance  Keep your orthosis away from any heat sources. Do not leave in your car.  Keep your orthosis away from pets.  You may clean your orthosis with cool water and soap or a mild cleanser.  Your orthosis may need adjustments due to changes in your medical condition (swelling, dressing size, additional surgery, etc.)  Monitor your skin color and integrity.  Do not try to adjust the orthosis on your own.     If you have any questions or concerns, please contact the therapy office at [phone].    I, Donaldo Claudio , have personally received the above described orthosis along with instructions on the wear, care, and precautions related to this orthosis. I understand that I am responsible for payment to [facility] of my deductible, coinsurance, or other portion of my charges not paid in full by my insurance plans, including any item that is not covered under the insurance plan.     Signature: _________________________________ Date: __________________    Therapist: SCOTTIE Storm/CARLOS, CHT       [facility] - OCCUPATIONAL THERAP[facility]  HOME EXERCISE PROGRAM     Complete the following exercises with 10 repetitions each, 4-6x/day.  "    AROM: DIP Flexion / Extension  Pinch middle knuckle to prevent bending. Bend end knuckle until stretch is felt.   Hold 3 seconds. Relax. Straighten finger as far as possible.    AROM: PIP Flexion / Extension  Pinch bottom knuckle  to prevent bending. Actively bend middle knuckle until stretch is felt.   Hold 3 seconds. Relax. Straighten finger as far as possible.      AROM: Isolated MCP Flexion / Extension ("Wave")   Bend only your large, bottom knuckles. Hold 3 seconds. Keep the tips of your fingers straight. Straighten fingers.    AROM: Isolated IPJ Flexion / Extension ("Hook")  Bend only your middle and end knuckles. Hold 3 seconds.   Straighten your fingers.     AROM: MCP and PIP Flexion / Extension ("Straight Fist")  Bend your bottom and middle knuckles, keeping the tips of your fingers straight. Try to touch the pads of your fingers on your palm. Hold 3 seconds. Straighten your fingers.     AROM: Composite Flexion / Extension ("Full Fist")  Bend every joint in your hand into a fist. Hold 3 seconds. Straighten your fingers.     AROM: Composte Extension ("Finger Lifts")  Lift your finger off of the table one at a time. Hold 3 seconds. Relax your finger.    AROM: Abduction / Adduction  With hand flat on table, spread all fingers apart, then bring them together as close as possible.    AROM: Wrist Flexion / Extension               Bend your wrist forward and back as far as possible.      AROM: Wrist Radial / Ulnar Deviation  Bend your wrist from side to side as far as possible.    AROM: Wrist Flexion / Extension  Make a fist, then bend your wrist forward then back as far as possible.         AROM: Wrist Radial / Ulnar Deviation   Make a fist then bend your wrist toward your body, then away.         Copyright © [employer]. All rights reserved.           "

## 2024-09-17 NOTE — PROGRESS NOTES
[facility]  Occupational Therapy Orthotic Note    Patient: Donaldo Claudio  MRN: [id number]  Date of visit: 9/17/2024  Referring Physician:  Cornelio Nolasco MD   Next MD visit: 9/25/2024   Medical Diagnosis: S62.324A (ICD-10-CM) - Closed displaced fracture of shaft of fourth metacarpal bone of right hand, initial encounter   Surgical Procedure and Date: Right hand ORIF of 4th metacarpal (Right), 9/13/2024     Treatment Diagnosis:   Encounter Diagnoses   Name Primary?    Closed displaced fracture of shaft of fourth metacarpal bone of right hand, initial encounter     Reduced hand strength Yes    Range of motion deficit      TIME RECORD    Start Time:  9:00 am  Stop Time:  10:00 am    PROCEDURES:      UNTIMED  Procedure Min.    40             Total Timed Minutes:  20 min  Total Timed Units:  2  Total Untimed Units:  1  Charges Billed/# of units:    x 1    Subjective:     Pt reports: doing well, no complaints.     Pain: 2-3/10  Location: R hand    Objective:     Patient seen by OT this session for fabrication of orthosis per MD orders. Fabricated and applied custom thermoplastic forearm-based ulnar gutter orthosis () to RUE.    Orthotic fit and training for 10 minutes, including:  - Following instruction on wound dressing with adaptic touch, sterile gauze cling wrap, and stockinette, pt educated on orthosis purpose, donning/doffing, positioning, wear, care, and precautions. Patient/caregiver were provided written instructions and educated to monitor for increased pain/edema, pressure points, skin breakdown or redness/skin irritation. Patient/caregiver to contact clinic for adjustments as needed.   - Reviewed NWB precautions; no heavy lifting, forceful gripping, or weightbearing.    Therapeutic exercises to develop endurance, ROM, and flexibility for 10 minutes, including:  - DIP/PIP jt blocking  - TGEs  - Comp digit ext over towel roll, digit add/abd  - Wrist RD/UD,  flex/ext    Assessment:     Orthosis with good fit following fabrication. Pt. Able to laila/doff independently.      Patient Education/Response:     Pt instructed to wear continuous, remove for bathing or hygiene and exercises. Patient/caregiver were provided written instructions on orthosis purpose, wear schedule, care and precautions to monitor for increased pain/edema, pressure points, skin breakdown or redness/skin irritation. Patient/caregiver to contact clinic for adjustments as needed. Patient signed copy of orthosis instructions, acknowledging delivery and understanding of wear, care, and precautions.    (See Media for scanned documents)    Plans and Goals:     Goal of Orthosis to protect Sx site/injury site. Pt to return for formal OT evaluation on 9/24/2024. Orthosis Check PRN, f/u with MD at [facility].    Brianna Moon, OTR/L, CHT

## 2024-09-24 ENCOUNTER — CLINICAL SUPPORT (OUTPATIENT)
Dept: REHABILITATION | Facility: HOSPITAL | Age: 29
End: 2024-09-24
Payer: COMMERCIAL

## 2024-09-24 DIAGNOSIS — M25.60 RANGE OF MOTION DEFICIT: ICD-10-CM

## 2024-09-24 DIAGNOSIS — S62.324A CLOSED DISPLACED FRACTURE OF SHAFT OF FOURTH METACARPAL BONE OF RIGHT HAND, INITIAL ENCOUNTER: Primary | ICD-10-CM

## 2024-09-24 DIAGNOSIS — R29.898 REDUCED HAND STRENGTH: Primary | ICD-10-CM

## 2024-09-24 PROCEDURE — 97165 OT EVAL LOW COMPLEX 30 MIN: CPT

## 2024-09-24 PROCEDURE — 97110 THERAPEUTIC EXERCISES: CPT

## 2024-09-24 NOTE — PROGRESS NOTES
[facility]: Occupational Therapy Note     See plan of care for full initial OT evaluation.    Brianna Moon, IDALIA, OTR/L, CHT

## 2024-09-25 ENCOUNTER — OFFICE VISIT (OUTPATIENT)
Dept: ORTHOPEDICS | Facility: CLINIC | Age: 29
End: 2024-09-25
Payer: COMMERCIAL

## 2024-09-25 ENCOUNTER — HOSPITAL ENCOUNTER (OUTPATIENT)
Dept: RADIOLOGY | Facility: HOSPITAL | Age: 29
Discharge: HOME | End: 2024-09-25
Attending: ORTHOPAEDIC SURGERY
Payer: COMMERCIAL

## 2024-09-25 VITALS — HEIGHT: 72 IN | BODY MASS INDEX: 28.97 KG/M2 | WEIGHT: 213.88 LBS

## 2024-09-25 DIAGNOSIS — S62.324A CLOSED DISPLACED FRACTURE OF SHAFT OF FOURTH METACARPAL BONE OF RIGHT HAND, INITIAL ENCOUNTER: ICD-10-CM

## 2024-09-25 DIAGNOSIS — Z98.890 POST-OPERATIVE STATE: ICD-10-CM

## 2024-09-25 DIAGNOSIS — S62.324A CLOSED DISPLACED FRACTURE OF SHAFT OF FOURTH METACARPAL BONE OF RIGHT HAND, INITIAL ENCOUNTER: Primary | ICD-10-CM

## 2024-09-25 PROCEDURE — 1160F RVW MEDS BY RX/DR IN RCRD: CPT | Mod: CPTII,S$GLB,, | Performed by: ORTHOPAEDIC SURGERY

## 2024-09-25 PROCEDURE — 99999 PR PBB SHADOW E&M-EST. PATIENT-LVL III: CPT | Mod: PBBFAC,,, | Performed by: ORTHOPAEDIC SURGERY

## 2024-09-25 PROCEDURE — 73130 X-RAY EXAM OF HAND: CPT | Mod: 26,RT,, | Performed by: RADIOLOGY

## 2024-09-25 PROCEDURE — 73130 X-RAY EXAM OF HAND: CPT | Mod: TC,RT

## 2024-09-25 PROCEDURE — 1159F MED LIST DOCD IN RCRD: CPT | Mod: CPTII,S$GLB,, | Performed by: ORTHOPAEDIC SURGERY

## 2024-09-25 PROCEDURE — 99024 POSTOP FOLLOW-UP VISIT: CPT | Mod: S$GLB,,, | Performed by: ORTHOPAEDIC SURGERY
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
## 2024-09-25 NOTE — PROGRESS NOTES
BOBBY Nolasco M.D.  Orthopaedic Hand and Wrist Surgery  [facility]    Patient ID: Donaldo Claudio  YOB: 1995  MRN: [id number]    Date of Surgery:  2024    Procedure Performed:   Right hand ORIF of 4th metacarpal - Right    History of Present Illness: Donaldo Claudio is a 29 y.o. male two weeks out from surgery he is doing well he has gotten into therapy    Patient was queried and this is the extent of the patients current complaints today.    Physical Exam:   Wound:  Healed well no signs of infection suture removed today  Sensation:  Intact  Motor:  Able to make a composite fist fully extend all fingers extensor lag of the PIP joint of 10° today  Swelling:  As expected  No rotational deformity    Imaging:   X-rays of the right hand show stable fixation of the fracture no loss of reduction    Provider Note/Medical Decision Makin. Closed displaced fracture of shaft of fourth metacarpal bone of right hand, initial encounter  -     X-Ray Hand Complete Right; Future; Expected date: 2024    2. Post-operative state         Do not submerge or soak wound for 2 weeks.  Showers, washing hands and running water is okay.  No heavy lifting, pushing or pulling.  Start desensitization exercises.  Patient understands the risk of hardware failure.  Restrictions stated above in place until next visit.  RTC in 4 weeks.    I discussed worrisome and red flag signs and symptoms with the patient. The patient expressed understanding and agreed to alert me immediately or to go to the emergency room if they experience any of these.   Treatment plan was developed with input from the patient/family, and they expressed understanding and agreement with the plan. All questions were answered today.    There are no Patient Instructions on file for this visit.    BOBBY Nolasco M.D.  [facility]  Orthopedic Hand and Wrist Surgeon    [facility]    Brooks Nolasco   [place] Review   [employer]   ReferMe     Disclaimer: This note was prepared using a voice recognition system and is likely to have sound alike errors within the text.

## 2024-10-03 ENCOUNTER — CLINICAL SUPPORT (OUTPATIENT)
Dept: REHABILITATION | Facility: HOSPITAL | Age: 29
End: 2024-10-03
Payer: COMMERCIAL

## 2024-10-03 DIAGNOSIS — R29.898 REDUCED HAND STRENGTH: Primary | ICD-10-CM

## 2024-10-03 DIAGNOSIS — M25.60 RANGE OF MOTION DEFICIT: ICD-10-CM

## 2024-10-03 PROCEDURE — 97110 THERAPEUTIC EXERCISES: CPT

## 2024-10-03 PROCEDURE — 97140 MANUAL THERAPY 1/> REGIONS: CPT

## 2024-10-03 PROCEDURE — 97018 PARAFFIN BATH THERAPY: CPT

## 2024-10-03 NOTE — PROGRESS NOTES
[facility]  Occupational Therapy Treatment Note    Date: 10/3/2024  Name: Donaldo Claudio  Clinic Number: [id number]    Therapy Diagnosis:   Encounter Diagnoses   Name Primary?    Reduced hand strength Yes    Range of motion deficit      Physician: Cornelio Nolasco MD    Physician Orders: OT Eval and Treat  Medical Diagnosis: S62.324A (ICD-10-CM) - Closed displaced fracture of shaft of fourth metacarpal bone of right hand, initial encounter   Surgical Procedure and Date: Right hand ORIF of 4th metacarpal (Right), 9/13/2024 / Date of Injury/Onset: 9/7/24  Evaluation Date: 9/24/2024  Insurance Authorization Period Expiration: 12/31/2024   Plan of Care Expiration: 12/20/24 (12 weeks)  Date of Return to MD: 10/23/2024   Visit # / Visits authorized: 2 / 10  FOTO: 1/3    Precautions:  Standard and Weightbearing     Time In: 1:00 pm  Time Out: 1:26 pm  Total Billable Time: 26 minutes    SUBJECTIVE     Pt reports: doing well, no complaints.   He was compliant with home exercise program given last session.   Response to previous treatment: improving motion   Functional change: improved grasp    Pain: 0/10  Location: right hand    OBJECTIVE   Objective Measures updated at progress report unless specified.    Observation/Appearance: Orthosis absent, incision healing well.      Edema. Measured in centimeters.    9/24/2024 9/24/2024     Left Right   Ring:         P1            6.8 7.6    PIP            6.7 7.6      Elbow and Wrist ROM. WFLs - all planes of motion.     Hand ROM. Measured in degrees.    9/24/2024 9/24/2024     Left Right           Index:  WFL WFL           Long:   WFL WFL           Ring:   MP 0/70 0/45              PIP 0/90 -15/80              DIP 0/85 0/50              COPPOLA 245 160           Small:  MP 0/75 0/60               PIP 0/95 0/95               DIP 0/90 0/75              COPPOLA 260 230           Thumb:               Opposition WFL touch to SF DPC  WFL touch to SF DPC         Strength (Dynamometer)     9/24/2024 9/24/2024     Left Right   Rung II NT NT      Sensation. Intact per report. Pt denies numbness and/or tingling in BUE(s).      Treatment     Donaldo received the treatments listed below:     Supervised modalities after being cleared for contradictions: Paraffin bath - with moist heat pack to R hand(s) in fist stretch for 10 minutes to increase blood flow, circulation, pain management, and for tissue elasticity prior to therex.     Manual therapy techniques: Manual Lymphatic Drainage and Soft tissue Mobilization were applied to the: R hand for 10 minutes, including:  - Performed scar massage to decrease adhesions and improve tensile glide.   - Performed soft tissue mobilization/massage to relieve associated soft tissue tightness, improve joint mobility, decrease pain, and improve lymphatic drainage to increase ROM.     Therapeutic exercises to develop endurance, ROM, and flexibility for 16 minutes, including:  - Paraffin bath with moist heat pack to R hand(s) in fist stretch for 10 minutes to increase blood flow, circulation, pain management, and for tissue elasticity prior to therex.   - TGEs x 10 reps each  - Reverse joint blocking x 10 reps  - Finger add/abd x 10 reps  - Comp digit ext x 15+ reps    Patient Education and Home Exercises      Education provided:   - HEP in place  - Progress towards goals     Written Home Exercises Provided: Patient instructed to cont prior HEP.  Exercises were reviewed and Donaldo was able to demonstrate them prior to the end of the session.  Donaldo demonstrated good  understanding of the HEP provided. See EMR under Patient Instructions for exercises provided during therapy sessions.      ASSESSMENT     Pt participated well and endorses good adherence to HEP. Reports improvements in light hand function. ROM has significantly improved to full fist. Will progress as tolerated and per protocol.      Donaldo is progressing well towards his goals and  there are no updates to goals at this time. Pt prognosis is Excellent.     Pt will continue to benefit from skilled outpatient occupational therapy to address the deficits listed in the problem list on initial evaluation, provide pt/family education and to maximize pt's level of independence in the home and community environment.     Pt's spiritual, cultural and educational needs considered and pt agreeable to plan of care and goals.    Anticipated barriers to occupational therapy: none identified at this time     Goals:   The following goals were discussed with the patient and patient is in agreement with them as to be addressed in the treatment plan.   Long Term Goals (LTGs); to be met by discharge.  LTG #1: Pt will report a pain level of 0-1 out of 10 at worst with daily hand use. progressing not met 10/3/2024  LTG #2: Pt will demo improved FOTO score by 10-15 points. progressing not met 10/3/2024  LTG #3: Pt will return to prior level of function for IADLs and household management. progressing not met 10/3/2024     Short Term Goals (STGs); to be met within 10/25/24 (4 weeks).  STG #1: Pt will report a pain level of 2-3 out of 10 at worst with daily hand use. progressing not met 10/3/2024  STG #2: Pt will report/demo [place] with ADLs. progressing not met 10/3/2024  STG #3: Pt will demonstrate independence with issued HEP, orthosis wear, and modalities for pain/symptom management. progressing not met 10/3/2024  STG #4: Pt will demo improved R RF COPPOLA by 50-60 degrees needed to aid with grasping. progressing not met 10/3/2024  STG #5: Decrease edema of R RF by 0.2-0.5 cm to increase joint mobility/flexibility for hand/arm use. progressing not met 10/3/2024  STG #6: Assess MMT and/or  strength when appropriate and update goals as needed. progressing not met 10/3/2024    PLAN     Continue skilled occupational therapy with individualized plan of care focusing on maximizing functional use of patient's right  hand.    Updates/Grading for next session: progress as tolerated and per protocol.    Brianna Moon, MOT, OTR/L, CHT

## 2024-10-11 ENCOUNTER — OFFICE VISIT (OUTPATIENT)
Dept: URGENT CARE | Facility: CLINIC | Age: 29
End: 2024-10-11
Payer: COMMERCIAL

## 2024-10-11 VITALS
TEMPERATURE: 98 F | WEIGHT: 210 LBS | DIASTOLIC BLOOD PRESSURE: 70 MMHG | RESPIRATION RATE: 16 BRPM | HEIGHT: 72 IN | SYSTOLIC BLOOD PRESSURE: 122 MMHG | HEART RATE: 80 BPM | BODY MASS INDEX: 28.44 KG/M2

## 2024-10-11 DIAGNOSIS — J06.9 UPPER RESPIRATORY TRACT INFECTION, UNSPECIFIED TYPE: Primary | ICD-10-CM

## 2024-10-11 RX ORDER — METHYLPREDNISOLONE 4 MG/1
TABLET ORAL
Qty: 21 EACH | Refills: 0 | Status: SHIPPED | OUTPATIENT
Start: 2024-10-11 | End: 2024-11-01

## 2024-10-11 RX ORDER — LORATADINE PSEUDOEPHEDRINE SULFATE 10; 240 MG/1; MG/1
1 TABLET, EXTENDED RELEASE ORAL DAILY
COMMUNITY
Start: 2024-10-11 | End: 2024-10-21

## 2024-10-11 RX ORDER — MONTELUKAST SODIUM 10 MG/1
10 TABLET ORAL NIGHTLY
Qty: 30 TABLET | Refills: 0 | Status: SHIPPED | OUTPATIENT
Start: 2024-10-11 | End: 2024-11-10

## 2024-10-11 NOTE — PROGRESS NOTES
Subjective:      Patient ID: Donaldo Claudio is a 29 y.o. male.    Vitals:  height is 6' (1.829 m) and weight is 95.3 kg (210 lb). His tympanic temperature is 97.6 °F (36.4 °C). His blood pressure is 122/70 and his pulse is 80. His respiration is 16.     Chief Complaint: URI    Stuffy nose and sore throat x Wednesday.  Usually a steroid shot helps pt with this. Otc meds not helping.  Sinus pressure and declined testing in triage.     URI   This is a new problem. The current episode started in the past 7 days. The problem has been waxing and waning. There has been no fever. The cough is Non-productive. Associated symptoms include sinus pain and a sore throat. He has tried decongestant for the symptoms. The treatment provided no relief.       Constitution: Negative.   HENT:  Positive for sinus pain and sore throat.    Neck: neck negative.   Cardiovascular: Negative.    Respiratory: Negative.     Skin: Negative.       Objective:     Physical Exam   Constitutional: No distress.   HENT:   Head: Normocephalic.   Nose: Nose normal.   Cardiovascular: Normal rate and regular rhythm.   Pulmonary/Chest: Effort normal and breath sounds normal.   Abdominal: Normal appearance.   Neurological: He is alert.   Skin: Skin is warm and dry.       Assessment:     1. Upper respiratory tract infection, unspecified type        Plan:   Mr. Claudio presents for sinus pressure onset 3 days ago. Associated symptoms: sore throat, sinus pressure. Has tried mucinex and zyrtec. Decline POC testing. States I feel like it just sinuses.    Upper respiratory tract infection, unspecified type  -     methylPREDNISolone (MEDROL DOSEPACK) 4 mg tablet; use as directed  Dispense: 21 each; Refill: 0  -     montelukast (SINGULAIR) 10 mg tablet; Take 1 tablet (10 mg total) by mouth every evening.  Dispense: 30 tablet; Refill: 0  -     loratadine-pseudoephedrine  mg (CLARITIN-D 24 HOUR)  mg per 24 hr tablet; Take 1 tablet by mouth once daily. for 10  days      Patient Instructions   Drink lots of fluids like water, juice, or broth. This will help replace any fluids lost if you have a runny nose or fever. Warm tea or soup can help soothe a sore throat.  If the air in your home feels dry, use a cool mist humidifier. This can help a stuffy nose and make it easier to breathe.  You can also use saline nose drops to relieve stuffiness.  If you decide to take over-the-counter cough or cold medicines, follow the directions on the label carefully. Be sure you do not take more than 1 medicine that contains acetaminophen. Also, if you have a heart problem or high blood pressure, check with your doctor before you take any of these medicines.  Wash your hands often. Cough or sneeze into a tissue or your elbow instead of your hands. This will help keep others healthy.      You must understand that you've received an Urgent Care treatment only and that you may be released before all your medical problems are known or treated. You, the patient, will arrange for follow up care as instructed.  If your condition worsens we recommend that you receive another evaluation at the emergency room immediately or contact your primary medical clinics after hours call service to discuss your concerns.  Please return here or go to the Emergency Department for any concerns or worsening of condition.

## 2024-10-17 ENCOUNTER — CLINICAL SUPPORT (OUTPATIENT)
Dept: REHABILITATION | Facility: HOSPITAL | Age: 29
End: 2024-10-17
Payer: COMMERCIAL

## 2024-10-17 DIAGNOSIS — R29.898 REDUCED HAND STRENGTH: Primary | ICD-10-CM

## 2024-10-17 DIAGNOSIS — M25.60 RANGE OF MOTION DEFICIT: ICD-10-CM

## 2024-10-17 PROCEDURE — 97110 THERAPEUTIC EXERCISES: CPT

## 2024-10-17 PROCEDURE — 97140 MANUAL THERAPY 1/> REGIONS: CPT

## 2024-10-17 NOTE — PROGRESS NOTES
"  [facility]  Occupational Therapy Treatment Note    Date: 10/17/2024  Name: Donaldo Claudio  Clinic Number: [id number]    Therapy Diagnosis:   Encounter Diagnoses   Name Primary?    Reduced hand strength Yes    Range of motion deficit      Physician: Cornelio Nolasco MD    Physician Orders: OT Eval and Treat  Medical Diagnosis: S62.324A (ICD-10-CM) - Closed displaced fracture of shaft of fourth metacarpal bone of right hand, initial encounter   Surgical Procedure and Date: Right hand ORIF of 4th metacarpal (Right), 9/13/2024 / Date of Injury/Onset: 9/7/24  Evaluation Date: 9/24/2024  Insurance Authorization Period Expiration: 12/31/2024   Plan of Care Expiration: 12/20/24 (12 weeks)  Date of Return to MD: 10/23/2024   Visit # / Visits authorized: 3 / 10  FOTO: 2/3    Precautions:  Standard and Weightbearing     Time In: 1:10 pm  Time Out: 1:29 pm  Total Billable Time: 19 minutes    SUBJECTIVE     Pt reports: "It's gotten a lot better. Feeling a lot better." Doing well, no complaints and no pain.   He was compliant with home exercise program given last session.   Response to previous treatment: improving motion   Functional change: full fist; 12 point improvement in FOTO    Pain: 0/10  Location: right hand    OBJECTIVE   Objective Measures updated at progress report unless specified.    Observation/Appearance: Orthosis absent, incision healing well.      Edema. Measured in centimeters.    9/24/2024 9/24/2024 10/17/2024     Left Right Right   Ring:          P1            6.8 7.6 7.1    PIP            6.7 7.6 7.2      Elbow and Wrist ROM. WFLs - all planes of motion.     Hand ROM. Measured in degrees.    9/24/2024 9/24/2024 10/17/2024     Left Right Right            Index:  WFL WFL             Long:   WFL WFL             Ring:   MP 0/70 0/45 0/75              PIP 0/90 -15/80 0/95              DIP 0/85 0/50 0/75              COPPOLA 245 160 245 (+85)            Small:  MP 0/75 0/60 0/90              "  PIP 0/95 0/95 0/95               DIP 0/90 0/75 0/90              COPPOLA 260 230 275 (+45)            Thumb:                Opposition WFL touch to SF DPC  WFL touch to SF DPC         Strength (Dynamometer)     9/24/2024 9/24/2024     Left Right   Rung II NT NT      Sensation. Intact per report. Pt denies numbness and/or tingling in BUE(s).      CMS Impairment/Limitation/Restriction for FOTO Hand Survey    Therapist reviewed FOTO scores for Donaldo Claudio on 10/17/2024.   FOTO documents entered into [employer] - see Media section.    Limitation Score: 31% (12 point improvement)       Treatment     Donaldo received the treatments listed below:     Supervised modalities after being cleared for contradictions: *NT    Manual therapy techniques: Manual Lymphatic Drainage and Soft tissue Mobilization were applied to the: R hand for 8 minutes, including:  - Performed scar massage to decrease adhesions and improve tensile glide.   - Performed soft tissue mobilization/massage to relieve associated soft tissue tightness, improve joint mobility, decrease pain, and improve lymphatic drainage to increase ROM.     Therapeutic exercises to develop endurance, ROM, and flexibility for 11 minutes, including:  - Updated objective measurements, see above.   - TGEs x 10 reps each   - Reverse joint blocking x 10 reps   - Finger add/abd x 10 reps  - Comp digit ext x 5+ reps    Patient Education and Home Exercises      Education provided:   - HEP in place  - Progress towards goals     Written Home Exercises Provided: Patient instructed to cont prior HEP.  Exercises were reviewed and Donaldo was able to demonstrate them prior to the end of the session.  Donaldo demonstrated good  understanding of the HEP provided. See EMR under Patient Instructions for exercises provided during therapy sessions.      ASSESSMENT     Pt participated well and endorses good adherence to HEP. Reports improvements in light hand function. ROM has significantly improved  to full fist. Increased digit ROM and decreased edema per formal assessments. He demo 12 point improvement in FOTO. Will progress as tolerated and per protocol.      Donaldo is progressing well towards his goals and there are no updates to goals at this time. Pt prognosis is Excellent.     Pt will continue to benefit from skilled outpatient occupational therapy to address the deficits listed in the problem list on initial evaluation, provide pt/family education and to maximize pt's level of independence in the home and community environment.     Pt's spiritual, cultural and educational needs considered and pt agreeable to plan of care and goals.    Anticipated barriers to occupational therapy: none identified at this time     Goals:   The following goals were discussed with the patient and patient is in agreement with them as to be addressed in the treatment plan.   Long Term Goals (LTGs); to be met by discharge.  LTG #1: Pt will report a pain level of 0-1 out of 10 at worst with daily hand use. progressing not met 10/17/2024  LTG #2: Pt will demo improved FOTO score by 10-15 points. Met 10/17/2024  LTG #3: Pt will return to prior level of function for IADLs and household management. progressing not met 10/17/2024     Short Term Goals (STGs); to be met within 10/25/24 (4 weeks).  STG #1: Pt will report a pain level of 2-3 out of 10 at worst with daily hand use. Met 10/17/2024  STG #2: Pt will report/demo [place] with ADLs. Met 10/17/2024  STG #3: Pt will demonstrate independence with issued HEP, orthosis wear, and modalities for pain/symptom management. Met 10/17/2024  STG #4: Pt will demo improved R RF COPPOLA by 50-60 degrees needed to aid with grasping. Met 10/17/2024  STG #5: Decrease edema of R RF by 0.2-0.5 cm to increase joint mobility/flexibility for hand/arm use. Met 10/17/2024  STG #6: Assess MMT and/or  strength when appropriate and update goals as needed. progressing not met 10/17/2024    PLAN      Continue skilled occupational therapy with individualized plan of care focusing on maximizing functional use of patient's right hand.    Updates/Grading for next session: progress as tolerated and per protocol.    Brianna Moon, MOT, OTR/L, CHT

## 2024-10-23 ENCOUNTER — OFFICE VISIT (OUTPATIENT)
Dept: ORTHOPEDICS | Facility: CLINIC | Age: 29
End: 2024-10-23
Payer: COMMERCIAL

## 2024-10-23 ENCOUNTER — HOSPITAL ENCOUNTER (OUTPATIENT)
Dept: RADIOLOGY | Facility: HOSPITAL | Age: 29
Discharge: HOME | End: 2024-10-23
Attending: ORTHOPAEDIC SURGERY
Payer: COMMERCIAL

## 2024-10-23 DIAGNOSIS — S62.324A CLOSED DISPLACED FRACTURE OF SHAFT OF FOURTH METACARPAL BONE OF RIGHT HAND, INITIAL ENCOUNTER: ICD-10-CM

## 2024-10-23 DIAGNOSIS — Z98.890 POST-OPERATIVE STATE: Primary | ICD-10-CM

## 2024-10-23 PROCEDURE — 99999 PR PBB SHADOW E&M-EST. PATIENT-LVL II: CPT | Mod: PBBFAC,,, | Performed by: ORTHOPAEDIC SURGERY

## 2024-10-23 PROCEDURE — 73130 X-RAY EXAM OF HAND: CPT | Mod: TC,RT

## 2024-10-23 PROCEDURE — 73130 X-RAY EXAM OF HAND: CPT | Mod: 26,RT,, | Performed by: RADIOLOGY

## 2024-10-23 NOTE — PROGRESS NOTES
BOBBY Nolasco M.D.  Orthopaedic Hand and Wrist Surgery  [facility]    Patient ID: Donaldo Claudio  YOB: 1995  MRN: [id number]    Date of Surgery:  2024    Procedure Performed:   Right hand ORIF of 4th metacarpal - Right    History of Present Illness: Donaldo Claudio is a 29 y.o. male now 6 weeks out from ORIF of right 4th metacarpal he is doing well he has no soreness he has no new complaints denies any numbness and no stiffness    Patient was queried and this is the extent of the patients current complaints today.    Physical Exam:   Wound:  Healed well mild scarring of the proximal portion of the wound  Sensation:  Intact of the dorsum of the hand and fingertips  Motor:  Able to make a composite fist fully extend all fingers no lag  Swelling:  Minimal  No rotational deformity    Imaging:   X-rays reviewed which showed evidence of callus formation over the fracture    Provider Note/Medical Decision Makin. Post-operative state         He may gradually return back to his normal activities using pain as a guide  I have warned against impact activities for another 6 weeks  I have offered him follow up he would like to just call if he has any issues he is happy with the result    I discussed worrisome and red flag signs and symptoms with the patient. The patient expressed understanding and agreed to alert me immediately or to go to the emergency room if they experience any of these.   Treatment plan was developed with input from the patient/family, and they expressed understanding and agreement with the plan. All questions were answered today.    There are no Patient Instructions on file for this visit.    BOBBY Nolasco M.D.  [facility]  Orthopedic Hand and Wrist Surgeon    [facility]  Dr. Brooks Nolasco   [employer]   [employer]     Disclaimer: This note was prepared using a voice recognition system and is  likely to have sound alike errors within the text.

## 2024-10-24 ENCOUNTER — CLINICAL SUPPORT (OUTPATIENT)
Dept: REHABILITATION | Facility: HOSPITAL | Age: 29
End: 2024-10-24
Payer: COMMERCIAL

## 2024-10-24 DIAGNOSIS — M25.60 RANGE OF MOTION DEFICIT: ICD-10-CM

## 2024-10-24 DIAGNOSIS — R29.898 REDUCED HAND STRENGTH: Primary | ICD-10-CM

## 2024-10-24 PROCEDURE — 97110 THERAPEUTIC EXERCISES: CPT

## 2024-10-24 NOTE — PROGRESS NOTES
"  [facility]  Occupational Therapy Treatment Note/Discharge Summary     Date: 10/24/2024  Name: Donaldo Claudio  Clinic Number: [id number]    Therapy Diagnosis:   Encounter Diagnoses   Name Primary?    Reduced hand strength Yes    Range of motion deficit      Physician: Cornelio Nolasco MD    Physician Orders: OT Eval and Treat  Medical Diagnosis: S62.324A (ICD-10-CM) - Closed displaced fracture of shaft of fourth metacarpal bone of right hand, initial encounter   Surgical Procedure and Date: Right hand ORIF of 4th metacarpal (Right), 9/13/2024 / Date of Injury/Onset: 9/7/24  Evaluation Date: 9/24/2024  Insurance Authorization Period Expiration: 12/31/2024   Plan of Care Expiration: 12/20/24 (12 weeks)  Date of Return to MD: PRN  Visit # / Visits authorized: 4 / 10  FOTO: 3/3    Precautions:  Standard     Time In: 11:46 am  Time Out: 11:57 am  Total Billable Time: 11 minutes    SUBJECTIVE     Pt reports: "It's gotten a lot better. Feeling a lot better." Doing well, no complaints and no pain.   He was compliant with home exercise program given last session.   Response to previous treatment: improving motion   Functional change: full fist; 26 point improvement in FOTO     Pain: 0/10  Location: right hand    OBJECTIVE   Objective Measures updated at progress report unless specified.    Observation/Appearance: Incision well healed.      Edema. Measured in centimeters.    9/24/2024 9/24/2024 10/17/2024     Left Right Right   Ring:          P1            6.8 7.6 7.1    PIP            6.7 7.6 7.2      Elbow and Wrist ROM. WFLs - all planes of motion.     Hand ROM. Measured in degrees.    9/24/2024 9/24/2024 10/17/2024     Left Right Right            Index:  WFL WFL             Long:   WFL WFL             Ring:   MP 0/70 0/45 0/75              PIP 0/90 -15/80 0/95              DIP 0/85 0/50 0/75              COPPOLA 245 160 245 (+85)            Small:  MP 0/75 0/60 0/90               PIP 0/95 0/95 0/95 "               DIP 0/90 0/75 0/90              COPPOLA 260 230 275 (+45)            Thumb:                Opposition WFL touch to SF DPC  WFL touch to SF DPC         Strength (Dynamometer)     10/24/2024 10/24/2024     Left Right   Rung  128      Sensation. Intact per report. Pt denies numbness and/or tingling in BUE(s).      CMS Impairment/Limitation/Restriction for FOTO Hand Survey    Therapist reviewed FOTO scores for Donaldo Claudio on 10/24/2024.  FOTO documents entered into [employer] - see Media section.    Limitation Score: 17% (26 point improvement)       Treatment     Donaldo received the treatments listed below:     Therapeutic exercises to develop endurance, ROM, and flexibility for 11 minutes, including:  - Updated objective measurements, see above.   - Gripping green putty x 2 min    Patient Education and Home Exercises      Education provided:   - HEP in place, added: green putty for hand strengthening as tolerated.  - Progress towards goals     Written Home Exercises Provided: Patient instructed to cont prior HEP with additions.  Exercises were reviewed and Donaldo was able to demonstrate them prior to the end of the session.  Donaldo demonstrated good  understanding of the HEP provided. See EMR under Patient Instructions for exercises provided during therapy sessions.      ASSESSMENT     Donaldo had good tolerance to treatment this date. Pt has attended outpatient OT services since 9/24/2024 (4 weeks) and has met all goals at this time. Pt has made significant gains with therapy, demonstrating return to PLOF with ADLs/IADLs, with improved ROM, decreased pain and edema, and hand strength WFLs. Pt reports no difficulty performing ADLs and IADLs and would like to continue improving at home using home program. He is independent with HEP and progression and is appropriate for discharge from OT services at this time.     Pt's spiritual, cultural and educational needs considered and pt agreeable to plan of  care and goals.    Goals:   The following goals were discussed with the patient and patient is in agreement with them as to be addressed in the treatment plan.   Long Term Goals (LTGs); to be met by discharge.  LTG #1: Pt will report a pain level of 0-1 out of 10 at worst with daily hand use. Met 10/24/2024  LTG #2: Pt will demo improved FOTO score by 10-15 points. Met 10/17/2024  LTG #3: Pt will return to prior level of function for IADLs and household management. Met 10/24/2024     Short Term Goals (STGs); to be met within 10/25/24 (4 weeks).  STG #1: Pt will report a pain level of 2-3 out of 10 at worst with daily hand use. Met 10/17/2024  STG #2: Pt will report/demo [place] with ADLs. Met 10/17/2024  STG #3: Pt will demonstrate independence with issued HEP, orthosis wear, and modalities for pain/symptom management. Met 10/17/2024  STG #4: Pt will demo improved R RF COPPOLA by 50-60 degrees needed to aid with grasping. Met 10/17/2024  STG #5: Decrease edema of R RF by 0.2-0.5 cm to increase joint mobility/flexibility for hand/arm use. Met 10/17/2024  STG #6: Assess MMT and/or  strength when appropriate and update goals as needed. Met 10/24/2024    Discharge reason: Patient has met all of his/her goals     PLAN     This patient is discharged from Outpatient Occupational Therapy Services.     Brianna Moon, MOT, OTR/L, CHT